# Patient Record
Sex: FEMALE | Race: BLACK OR AFRICAN AMERICAN | NOT HISPANIC OR LATINO | URBAN - METROPOLITAN AREA
[De-identification: names, ages, dates, MRNs, and addresses within clinical notes are randomized per-mention and may not be internally consistent; named-entity substitution may affect disease eponyms.]

---

## 2017-11-18 ENCOUNTER — EMERGENCY (EMERGENCY)
Facility: HOSPITAL | Age: 82
LOS: 1 days | Discharge: HOME | End: 2017-11-18
Attending: EMERGENCY MEDICINE | Admitting: EMERGENCY MEDICINE
Payer: MEDICARE

## 2017-11-18 VITALS
HEART RATE: 54 BPM | TEMPERATURE: 97 F | HEIGHT: 64 IN | DIASTOLIC BLOOD PRESSURE: 83 MMHG | RESPIRATION RATE: 14 BRPM | SYSTOLIC BLOOD PRESSURE: 182 MMHG | WEIGHT: 220.02 LBS | OXYGEN SATURATION: 99 %

## 2017-11-18 VITALS
HEART RATE: 56 BPM | DIASTOLIC BLOOD PRESSURE: 82 MMHG | RESPIRATION RATE: 15 BRPM | OXYGEN SATURATION: 99 % | SYSTOLIC BLOOD PRESSURE: 174 MMHG

## 2017-11-18 DIAGNOSIS — H26.9 UNSPECIFIED CATARACT: Chronic | ICD-10-CM

## 2017-11-18 PROBLEM — Z00.00 ENCOUNTER FOR PREVENTIVE HEALTH EXAMINATION: Status: ACTIVE | Noted: 2017-11-18

## 2017-11-18 PROCEDURE — 72040 X-RAY EXAM NECK SPINE 2-3 VW: CPT

## 2017-11-18 PROCEDURE — 99284 EMERGENCY DEPT VISIT MOD MDM: CPT | Mod: 25

## 2017-11-18 PROCEDURE — 72040 X-RAY EXAM NECK SPINE 2-3 VW: CPT | Mod: 26

## 2017-11-18 PROCEDURE — 73030 X-RAY EXAM OF SHOULDER: CPT

## 2017-11-18 PROCEDURE — 73030 X-RAY EXAM OF SHOULDER: CPT | Mod: 26,RT

## 2017-11-18 PROCEDURE — 99284 EMERGENCY DEPT VISIT MOD MDM: CPT

## 2017-11-18 RX ORDER — AMLODIPINE BESYLATE 2.5 MG/1
1 TABLET ORAL
Qty: 0 | Refills: 0 | COMMUNITY

## 2017-11-18 RX ORDER — MULTIVIT-MIN/FERROUS GLUCONATE 9 MG/15 ML
1 LIQUID (ML) ORAL
Qty: 0 | Refills: 0 | COMMUNITY

## 2017-11-18 RX ORDER — POTASSIUM CHLORIDE 20 MEQ
1 PACKET (EA) ORAL
Qty: 0 | Refills: 0 | COMMUNITY

## 2017-11-18 RX ORDER — ACETAMINOPHEN 500 MG
650 TABLET ORAL ONCE
Qty: 0 | Refills: 0 | Status: COMPLETED | OUTPATIENT
Start: 2017-11-18 | End: 2017-11-18

## 2017-11-18 RX ORDER — SIMVASTATIN 20 MG/1
1 TABLET, FILM COATED ORAL
Qty: 0 | Refills: 0 | COMMUNITY

## 2017-11-18 RX ORDER — FUROSEMIDE 40 MG
1 TABLET ORAL
Qty: 0 | Refills: 0 | COMMUNITY

## 2017-11-18 RX ORDER — EZETIMIBE 10 MG/1
1 TABLET ORAL
Qty: 0 | Refills: 0 | COMMUNITY

## 2017-11-18 RX ORDER — WARFARIN SODIUM 2.5 MG/1
1 TABLET ORAL
Qty: 0 | Refills: 0 | COMMUNITY

## 2017-11-18 RX ADMIN — Medication 650 MILLIGRAM(S): at 12:09

## 2017-11-18 RX ADMIN — Medication 650 MILLIGRAM(S): at 12:30

## 2017-11-18 NOTE — ED PROCEDURE NOTE - NS ED PERI VASCULAR NEG
no paresthesia/no swelling/no cyanosis of extremity/capillary refill time < 2 seconds/fingers/toes warm to touch

## 2017-11-18 NOTE — ED PROVIDER NOTE - MEDICAL DECISION MAKING DETAILS
89 yo female fell yesterday co rt shoulder and left neck pain. PE unrevealing plan - Xray, tylenol, ortho follow up.

## 2017-11-18 NOTE — ED ADULT NURSE REASSESSMENT NOTE - NS ED NURSE REASSESS COMMENT FT1
xray done and no fx. Pt is in no acute distress. discharge with instruction, ambulates to dc with family.

## 2017-11-18 NOTE — ED PROVIDER NOTE - OBJECTIVE STATEMENT
89 yo female fell yesterday co rt shoulder and left neck pain. Denies head injury LOC N V or other injury or symptom.

## 2017-11-18 NOTE — ED PROVIDER NOTE - CHPI ED SYMPTOMS NEG
no fever/no vomiting/no weakness/no deformity/no abrasion/no confusion/no numbness/no tingling/no bleeding/no loss of consciousness

## 2017-11-18 NOTE — ED ADULT NURSE NOTE - OBJECTIVE STATEMENT
patient is aaox3, had right eye surgery and fell on her right side, c/o right shoulder pain, no LOC, b/o shoulder has no sign's of trauma or bleeding, patient is on Coumadin, denies chest pain. family at bedside,

## 2021-05-06 ENCOUNTER — TRANSCRIPTION ENCOUNTER (OUTPATIENT)
Age: 86
End: 2021-05-06

## 2021-10-23 ENCOUNTER — TRANSCRIPTION ENCOUNTER (OUTPATIENT)
Age: 86
End: 2021-10-23

## 2022-03-06 ENCOUNTER — EMERGENCY (EMERGENCY)
Facility: HOSPITAL | Age: 87
LOS: 1 days | Discharge: HOME | End: 2022-03-06
Attending: EMERGENCY MEDICINE | Admitting: EMERGENCY MEDICINE
Payer: MEDICARE

## 2022-03-06 VITALS
DIASTOLIC BLOOD PRESSURE: 72 MMHG | SYSTOLIC BLOOD PRESSURE: 103 MMHG | OXYGEN SATURATION: 96 % | RESPIRATION RATE: 14 BRPM | TEMPERATURE: 98 F | HEART RATE: 75 BPM | HEIGHT: 64 IN | WEIGHT: 195.11 LBS

## 2022-03-06 DIAGNOSIS — H26.9 UNSPECIFIED CATARACT: Chronic | ICD-10-CM

## 2022-03-06 PROBLEM — I10 ESSENTIAL (PRIMARY) HYPERTENSION: Chronic | Status: ACTIVE | Noted: 2017-11-18

## 2022-03-06 PROBLEM — I63.9 CEREBRAL INFARCTION, UNSPECIFIED: Chronic | Status: ACTIVE | Noted: 2017-11-18

## 2022-03-06 PROBLEM — E78.5 HYPERLIPIDEMIA, UNSPECIFIED: Chronic | Status: ACTIVE | Noted: 2017-11-18

## 2022-03-06 PROCEDURE — 99283 EMERGENCY DEPT VISIT LOW MDM: CPT | Mod: 25

## 2022-03-06 PROCEDURE — 29515 APPLICATION SHORT LEG SPLINT: CPT | Mod: LT

## 2022-03-06 PROCEDURE — 73610 X-RAY EXAM OF ANKLE: CPT | Mod: 26,LT

## 2022-03-06 PROCEDURE — 73610 X-RAY EXAM OF ANKLE: CPT

## 2022-03-06 PROCEDURE — 99284 EMERGENCY DEPT VISIT MOD MDM: CPT | Mod: 25

## 2022-03-06 PROCEDURE — 29515 APPLICATION SHORT LEG SPLINT: CPT

## 2022-03-06 RX ORDER — OXYCODONE AND ACETAMINOPHEN 5; 325 MG/1; MG/1
1 TABLET ORAL ONCE
Refills: 0 | Status: DISCONTINUED | OUTPATIENT
Start: 2022-03-06 | End: 2022-03-06

## 2022-03-06 RX ADMIN — OXYCODONE AND ACETAMINOPHEN 1 TABLET(S): 5; 325 TABLET ORAL at 16:23

## 2022-03-06 NOTE — ED PROVIDER NOTE - MUSCULOSKELETAL, MLM
L ankle tenderness over lateral medial aspect, knee and hip nontender ROM intact, pulses and sensation intact, remainder of extremities unremarkable L ankle tenderness over medial aspect, knee and hip nontender ROM intact, pulses and sensation intact, remainder of extremities unremarkable

## 2022-03-06 NOTE — ED PROVIDER NOTE - OBJECTIVE STATEMENT
89 y/o F w/ PMHx of HTN, HLD, heart failure and CVA presents to ED c/o L ankle pain s/p mechanical fall yesterday. Pt reports that she fell yesterday morning and has had worsening L ankle pain since. Denies LOC, knee injury, head injury, neck pain or any other injury. Pt took Tylenol at 10 am w/ minimal relief. Pt states she cannot walk 2/2 pain. Pt is on warfarin.

## 2022-03-06 NOTE — ED PROVIDER NOTE - PATIENT PORTAL LINK FT
You can access the FollowMyHealth Patient Portal offered by [facility] by registering at the following website: [email or web address]. By joining [facility]’s FollowMyHealth portal, you will also be able to view your health information using other applications (apps) compatible with our system.

## 2022-03-06 NOTE — ED PROVIDER NOTE - CARE PROVIDER_API CALL
Dony Weathers  ORTHOPAEDIC SURGERY  [address] [place] [address]  Phone: [phone]  Fax: [phone]  Follow Up Time: Urgent

## 2022-03-06 NOTE — ED ADULT NURSE NOTE - LATERALITY
Pharmacy requesting refill of Tizanidine 4 mg. Medication active on med list yes      Date last ordered: 2/24/2021  verified on 5/13/2021  verified by MICHELLE Li LPN      Date of last appointment 4/15/2021    Next Visit Date:  8/30/2021 left

## 2022-03-06 NOTE — ED ADULT NURSE NOTE - NSFALLRSKASSESSTYPE_ED_ALL_ED
Robert Perez is calling to request a refill on the following medication(s):    Medication Request:  Requested Prescriptions     Pending Prescriptions Disp Refills    citalopram (CELEXA) 40 MG tablet 90 tablet 0       Last Visit Date (If Applicable):  37/35/4268    Next Visit Date:    3/31/2022 Initial (On Arrival)

## 2022-03-06 NOTE — ED PROVIDER NOTE - ENMT, MLM
Airway patent, Nasal mucosa clear. Mouth with normal mucosa. Throat has no vesicles, no oropharyngeal exudates and uvula is midline. atraumatic nontender, neck is supply full range of motion intact

## 2022-03-06 NOTE — ED ADULT NURSE NOTE - OBJECTIVE STATEMENT
89 y/o female received aox4 via stretcher, bibems for left ankle pain eval. pt reports slipping last night while trying to get to her commode and felt a crack on her ankle. tenderness noted to area. no obvious deformity seen.

## 2022-07-14 NOTE — ED ADULT TRIAGE NOTE - PAIN: PRESENCE, MLM
[de-identified] : Left tibia radiographs were obtained and independently reviewed in clinic on 01/18/2022 which are remarkable for a  tibia spiral fracture in anatomic alignment. good interval healing  No other osseous findings.\par  Normal volume, rate, productivity, spontaneity and articulation complains of pain/discomfort Normal volume, rate, productivity, spontaneity and articulation Normal volume, rate, productivity, spontaneity and articulation Normal volume, rate, productivity, spontaneity and articulation Normal volume, rate, productivity, spontaneity and articulation Normal volume, rate, productivity, spontaneity and articulation Normal volume, rate, productivity, spontaneity and articulation Normal volume, rate, productivity, spontaneity and articulation Normal volume, rate, productivity, spontaneity and articulation Normal volume, rate, productivity, spontaneity and articulation

## 2022-08-16 NOTE — ED ADULT NURSE NOTE - DISCHARGE DATE/TIME
[FreeTextEntry1] : Mr. Ojeda is a 78 
06-Mar-2022 16:54

## 2023-06-21 NOTE — ED ADULT TRIAGE NOTE - BSA (M2)
vitals taken for this visit. Review of Systems  Review of Systems   Constitutional:  Negative for chills, fatigue and fever. HENT:  Negative for congestion, hearing loss, sore throat and trouble swallowing. Eyes:  Negative for visual disturbance. Respiratory:  Negative for cough, shortness of breath and wheezing. Cardiovascular:  Negative for chest pain, palpitations and leg swelling. Gastrointestinal:  Negative for abdominal pain, blood in stool, nausea and vomiting. Genitourinary:  Negative for dysuria, frequency and urgency. Musculoskeletal:  Positive for arthralgias (left foot). Skin:  Negative for rash and wound. Neurological:  Negative for dizziness, light-headedness, numbness and headaches. Psychiatric/Behavioral:  Negative for dysphoric mood. The patient is not nervous/anxious. Physical Exam  Vitals and nursing note reviewed. Exam conducted with a chaperone present. Constitutional:       Appearance: Normal appearance. HENT:      Head: Normocephalic and atraumatic. Right Ear: External ear normal.      Left Ear: External ear normal.      Nose: Nose normal.      Mouth/Throat:      Mouth: Mucous membranes are moist.      Pharynx: Oropharynx is clear. Eyes:      Extraocular Movements: Extraocular movements intact. Cardiovascular:      Rate and Rhythm: Normal rate and regular rhythm. Pulses: Normal pulses. Heart sounds: Normal heart sounds. Pulmonary:      Effort: Pulmonary effort is normal.      Breath sounds: Normal breath sounds. No wheezing or rhonchi. Abdominal:      General: Bowel sounds are normal.      Palpations: Abdomen is soft. Tenderness: There is no abdominal tenderness. Musculoskeletal:      Cervical back: Normal range of motion and neck supple. Left foot: Decreased range of motion. Bony tenderness present. Comments: Hammertoe deformity left second toe. Skin:     General: Skin is warm and dry.       Findings: No lesion or
1.94